# Patient Record
Sex: MALE | Race: WHITE | NOT HISPANIC OR LATINO | Employment: FULL TIME | URBAN - METROPOLITAN AREA
[De-identification: names, ages, dates, MRNs, and addresses within clinical notes are randomized per-mention and may not be internally consistent; named-entity substitution may affect disease eponyms.]

---

## 2023-11-07 PROBLEM — L82.1 OTHER SEBORRHEIC KERATOSIS: Status: ACTIVE | Noted: 2017-07-12

## 2023-11-07 PROBLEM — M77.01 GOLFERS ELBOW OF RIGHT UPPER EXTREMITY: Status: ACTIVE | Noted: 2023-11-07

## 2023-11-07 PROBLEM — D22.39 MELANOCYTIC NEVI OF OTHER PARTS OF FACE: Status: ACTIVE | Noted: 2017-07-12

## 2023-11-07 PROBLEM — I10 BENIGN ESSENTIAL HTN: Status: ACTIVE | Noted: 2023-11-07

## 2023-11-07 PROBLEM — M25.521 RIGHT ELBOW PAIN: Status: ACTIVE | Noted: 2023-11-07

## 2023-11-08 ENCOUNTER — OFFICE VISIT (OUTPATIENT)
Dept: PRIMARY CARE | Facility: CLINIC | Age: 63
End: 2023-11-08
Payer: COMMERCIAL

## 2023-11-08 VITALS
TEMPERATURE: 97 F | HEIGHT: 75 IN | WEIGHT: 195.6 LBS | HEART RATE: 58 BPM | OXYGEN SATURATION: 97 % | DIASTOLIC BLOOD PRESSURE: 98 MMHG | SYSTOLIC BLOOD PRESSURE: 152 MMHG | BODY MASS INDEX: 24.32 KG/M2

## 2023-11-08 DIAGNOSIS — Z00.00 HEALTHCARE MAINTENANCE: Primary | ICD-10-CM

## 2023-11-08 DIAGNOSIS — I10 BENIGN ESSENTIAL HTN: ICD-10-CM

## 2023-11-08 PROCEDURE — 3080F DIAST BP >= 90 MM HG: CPT | Performed by: INTERNAL MEDICINE

## 2023-11-08 PROCEDURE — 3077F SYST BP >= 140 MM HG: CPT | Performed by: INTERNAL MEDICINE

## 2023-11-08 PROCEDURE — 99396 PREV VISIT EST AGE 40-64: CPT | Performed by: INTERNAL MEDICINE

## 2023-11-08 PROCEDURE — 1036F TOBACCO NON-USER: CPT | Performed by: INTERNAL MEDICINE

## 2023-11-08 RX ORDER — LISINOPRIL AND HYDROCHLOROTHIAZIDE 12.5; 2 MG/1; MG/1
1 TABLET ORAL DAILY
Qty: 30 TABLET | Refills: 5 | Status: SHIPPED | OUTPATIENT
Start: 2023-11-08 | End: 2024-04-25

## 2023-11-08 RX ORDER — HYDROCHLOROTHIAZIDE 12.5 MG/1
1 CAPSULE ORAL DAILY
COMMUNITY
Start: 2015-04-08 | End: 2023-11-08 | Stop reason: ALTCHOICE

## 2023-11-08 RX ORDER — ATENOLOL 25 MG/1
1 TABLET ORAL DAILY
COMMUNITY
Start: 2013-01-07 | End: 2023-12-04

## 2023-11-08 ASSESSMENT — ENCOUNTER SYMPTOMS
DIARRHEA: 0
SHORTNESS OF BREATH: 0
OCCASIONAL FEELINGS OF UNSTEADINESS: 0
HEADACHES: 0
DEPRESSION: 0
LOSS OF SENSATION IN FEET: 0
CONSTIPATION: 0
ABDOMINAL PAIN: 0

## 2023-11-08 ASSESSMENT — PATIENT HEALTH QUESTIONNAIRE - PHQ9
1. LITTLE INTEREST OR PLEASURE IN DOING THINGS: NOT AT ALL
SUM OF ALL RESPONSES TO PHQ9 QUESTIONS 1 AND 2: 0
2. FEELING DOWN, DEPRESSED OR HOPELESS: NOT AT ALL

## 2023-11-08 NOTE — PROGRESS NOTES
"Subjective   Patient ID: Irvin Galvan is a 63 y.o. male who presents for Annual Exam.    The patient completed right medial meniscectomy and chondroplasty of the patella in 12/30/2022.  He has recovered relatively well, and previously followed with  from Orthopedic Surgery.    The patient's blood pressure in the clinic today was elevated at 152/98 mmHg.  He suspects this may be due to work-related stress, and last had coffee this morning.  He does have a wrist sphygmomanometer at home.  The patient is currently maintained with atenolol 25 mg once daily and hydrochlorothiazide 12.5mg once daily and is tolerating this regimen well.  The patient denies any headache, dyspnea, chest pain, or chest pressure.  He does endorse occasional visual \"floaters\" and follows with Ophthalmology.    The patient follows regularly with a Dentist.  He denies any hearing impairment, abdominal pain, or bowel problems.      The patient works as a .      Review of Systems   HENT:  Negative for hearing loss.    Eyes:  Positive for visual disturbance.   Respiratory:  Negative for shortness of breath.    Cardiovascular:  Negative for chest pain.   Gastrointestinal:  Negative for abdominal pain, constipation and diarrhea.   Neurological:  Negative for headaches.       Objective   Physical Exam  Constitutional:       Appearance: Normal appearance.   Neck:      Vascular: No carotid bruit.   Cardiovascular:      Rate and Rhythm: Normal rate and regular rhythm.      Heart sounds: Normal heart sounds.   Pulmonary:      Effort: Pulmonary effort is normal.      Breath sounds: Normal breath sounds.   Abdominal:      General: Bowel sounds are normal.      Palpations: Abdomen is soft.      Tenderness: There is no abdominal tenderness.   Skin:     General: Skin is warm and dry.   Neurological:      General: No focal deficit present.      Mental Status: He is alert and oriented to person, place, and time. Mental status is at " baseline.   Psychiatric:         Mood and Affect: Mood normal.         Behavior: Behavior normal.         Assessment/Plan   Problem List Items Addressed This Visit             ICD-10-CM    Benign essential HTN I10    Relevant Medications    lisinopriL-hydrochlorothiazide 20-12.5 mg tablet     Other Visit Diagnoses         Codes    Healthcare maintenance    -  Primary Z00.00    Relevant Orders    Lipid panel    Comprehensive metabolic panel    CBC and Auto Differential    PSA            CPE Performed  -  Discussed healthy diet and regular exercise.    -  Physical exam overall unremarkable. Immunizations reviewed and updated accordingly. Healthy lifestyle choices discussed (tobacco avoidance, appropriate alcohol use, avoidance of illicit substances).   -  Patient is wearing seatbelt.   -  Screening lab work ordered as indicated.    -  Age appropriate screening tests reviewed with patient.        EKG unremarkable compared to previous.    IMPRESSIONS/PLAN:     HTN   - /98 in office today, similar on repeat.  Prescribed lisinopril-HCTZ 20-12.5mg QD, and continue atenolol 25mg QD.  STOP HCTZ 12.5mg every day and keep on hand for now.  Discussed adverse effect profile of lisinopril including dizziness and dry cough.  Advised patient to measure blood pressure at home regularly as demonstrated in the clinic.  Instructed patient to complete blood work one month from now to monitor renal function.  Call the clinic if consistently elevated above 140/90 or with any adverse effects.     Right Knee Pain   - s/p right medial meniscectomy and chondroplasty of the patella in 12/30/2022. Previously following with  from Orthopedic Surgery.    Health Maintenance   - Routine labs ordered including CBC, CMP, and a lipid panel to be completed in the fasting state. Added PSA. Last PSA wnl 11/2022. Last colonoscopy performed 3/2022, repeat due 3/2032.      Follow up for annual physical examination, call sooner if needed.         Scribe Attestation  By signing my name below, I, Chandra Retana   attest that this documentation has been prepared under the direction and in the presence of Juan C Stokes DO.

## 2023-12-04 DIAGNOSIS — I10 BENIGN ESSENTIAL HTN: Primary | ICD-10-CM

## 2023-12-04 RX ORDER — ATENOLOL 25 MG/1
25 TABLET ORAL DAILY
Qty: 90 TABLET | Refills: 3 | Status: SHIPPED | OUTPATIENT
Start: 2023-12-04

## 2023-12-14 ENCOUNTER — LAB (OUTPATIENT)
Dept: LAB | Facility: LAB | Age: 63
End: 2023-12-14
Payer: COMMERCIAL

## 2023-12-14 DIAGNOSIS — Z00.00 HEALTHCARE MAINTENANCE: ICD-10-CM

## 2023-12-14 LAB
ALBUMIN SERPL BCP-MCNC: 4.7 G/DL (ref 3.4–5)
ALP SERPL-CCNC: 39 U/L (ref 33–136)
ALT SERPL W P-5'-P-CCNC: 20 U/L (ref 10–52)
ANION GAP SERPL CALC-SCNC: 14 MMOL/L (ref 10–20)
AST SERPL W P-5'-P-CCNC: 21 U/L (ref 9–39)
BASOPHILS # BLD AUTO: 0.02 X10*3/UL (ref 0–0.1)
BASOPHILS NFR BLD AUTO: 0.2 %
BILIRUB SERPL-MCNC: 1.6 MG/DL (ref 0–1.2)
BUN SERPL-MCNC: 21 MG/DL (ref 6–23)
CALCIUM SERPL-MCNC: 10 MG/DL (ref 8.6–10.3)
CHLORIDE SERPL-SCNC: 99 MMOL/L (ref 98–107)
CHOLEST SERPL-MCNC: 217 MG/DL (ref 0–199)
CHOLESTEROL/HDL RATIO: 3.4
CO2 SERPL-SCNC: 32 MMOL/L (ref 21–32)
CREAT SERPL-MCNC: 0.81 MG/DL (ref 0.5–1.3)
EOSINOPHIL # BLD AUTO: 0.22 X10*3/UL (ref 0–0.7)
EOSINOPHIL NFR BLD AUTO: 2.5 %
ERYTHROCYTE [DISTWIDTH] IN BLOOD BY AUTOMATED COUNT: 11.9 % (ref 11.5–14.5)
GFR SERPL CREATININE-BSD FRML MDRD: >90 ML/MIN/1.73M*2
GLUCOSE SERPL-MCNC: 103 MG/DL (ref 74–99)
HCT VFR BLD AUTO: 45.1 % (ref 41–52)
HDLC SERPL-MCNC: 64 MG/DL
HGB BLD-MCNC: 15.3 G/DL (ref 13.5–17.5)
IMM GRANULOCYTES # BLD AUTO: 0.03 X10*3/UL (ref 0–0.7)
IMM GRANULOCYTES NFR BLD AUTO: 0.3 % (ref 0–0.9)
LDLC SERPL CALC-MCNC: 128 MG/DL
LYMPHOCYTES # BLD AUTO: 4.41 X10*3/UL (ref 1.2–4.8)
LYMPHOCYTES NFR BLD AUTO: 50.9 %
MCH RBC QN AUTO: 33 PG (ref 26–34)
MCHC RBC AUTO-ENTMCNC: 33.9 G/DL (ref 32–36)
MCV RBC AUTO: 97 FL (ref 80–100)
MONOCYTES # BLD AUTO: 1.14 X10*3/UL (ref 0.1–1)
MONOCYTES NFR BLD AUTO: 13.1 %
NEUTROPHILS # BLD AUTO: 2.85 X10*3/UL (ref 1.2–7.7)
NEUTROPHILS NFR BLD AUTO: 33 %
NON HDL CHOLESTEROL: 153 MG/DL (ref 0–149)
NRBC BLD-RTO: 0 /100 WBCS (ref 0–0)
PLATELET # BLD AUTO: 260 X10*3/UL (ref 150–450)
POTASSIUM SERPL-SCNC: 4.4 MMOL/L (ref 3.5–5.3)
PROT SERPL-MCNC: 7.7 G/DL (ref 6.4–8.2)
PSA SERPL-MCNC: 0.82 NG/ML
RBC # BLD AUTO: 4.63 X10*6/UL (ref 4.5–5.9)
SODIUM SERPL-SCNC: 141 MMOL/L (ref 136–145)
TRIGL SERPL-MCNC: 125 MG/DL (ref 0–149)
VLDL: 25 MG/DL (ref 0–40)
WBC # BLD AUTO: 8.7 X10*3/UL (ref 4.4–11.3)

## 2023-12-14 PROCEDURE — 80061 LIPID PANEL: CPT

## 2023-12-14 PROCEDURE — 80053 COMPREHEN METABOLIC PANEL: CPT

## 2023-12-14 PROCEDURE — 36415 COLL VENOUS BLD VENIPUNCTURE: CPT

## 2023-12-14 PROCEDURE — 84153 ASSAY OF PSA TOTAL: CPT

## 2023-12-14 PROCEDURE — 85025 COMPLETE CBC W/AUTO DIFF WBC: CPT

## 2024-01-16 DIAGNOSIS — I10 BENIGN ESSENTIAL HTN: Primary | ICD-10-CM

## 2024-01-16 RX ORDER — HYDROCHLOROTHIAZIDE 12.5 MG/1
12.5 CAPSULE ORAL DAILY
Qty: 90 CAPSULE | Refills: 3 | Status: SHIPPED | OUTPATIENT
Start: 2024-01-16

## 2024-04-25 DIAGNOSIS — I10 BENIGN ESSENTIAL HTN: ICD-10-CM

## 2024-04-25 RX ORDER — LISINOPRIL AND HYDROCHLOROTHIAZIDE 12.5; 2 MG/1; MG/1
1 TABLET ORAL DAILY
Qty: 30 TABLET | Refills: 5 | Status: SHIPPED | OUTPATIENT
Start: 2024-04-25

## 2024-10-20 DIAGNOSIS — I10 BENIGN ESSENTIAL HTN: ICD-10-CM

## 2024-10-21 RX ORDER — LISINOPRIL AND HYDROCHLOROTHIAZIDE 12.5; 2 MG/1; MG/1
1 TABLET ORAL DAILY
Qty: 30 TABLET | Refills: 1 | Status: SHIPPED | OUTPATIENT
Start: 2024-10-21

## 2024-11-08 ENCOUNTER — APPOINTMENT (OUTPATIENT)
Dept: PRIMARY CARE | Facility: CLINIC | Age: 64
End: 2024-11-08
Payer: COMMERCIAL

## 2024-11-08 VITALS
OXYGEN SATURATION: 98 % | WEIGHT: 181 LBS | RESPIRATION RATE: 16 BRPM | SYSTOLIC BLOOD PRESSURE: 128 MMHG | HEART RATE: 71 BPM | HEIGHT: 74 IN | TEMPERATURE: 97.5 F | DIASTOLIC BLOOD PRESSURE: 70 MMHG | BODY MASS INDEX: 23.23 KG/M2

## 2024-11-08 DIAGNOSIS — Z00.00 HEALTHCARE MAINTENANCE: Primary | ICD-10-CM

## 2024-11-08 PROCEDURE — 3008F BODY MASS INDEX DOCD: CPT | Performed by: INTERNAL MEDICINE

## 2024-11-08 PROCEDURE — 99396 PREV VISIT EST AGE 40-64: CPT | Performed by: INTERNAL MEDICINE

## 2024-11-08 PROCEDURE — 3078F DIAST BP <80 MM HG: CPT | Performed by: INTERNAL MEDICINE

## 2024-11-08 PROCEDURE — 1036F TOBACCO NON-USER: CPT | Performed by: INTERNAL MEDICINE

## 2024-11-08 PROCEDURE — 3074F SYST BP LT 130 MM HG: CPT | Performed by: INTERNAL MEDICINE

## 2024-11-08 PROCEDURE — 90471 IMMUNIZATION ADMIN: CPT | Performed by: INTERNAL MEDICINE

## 2024-11-08 PROCEDURE — 90656 IIV3 VACC NO PRSV 0.5 ML IM: CPT | Performed by: INTERNAL MEDICINE

## 2024-11-08 PROCEDURE — 93000 ELECTROCARDIOGRAM COMPLETE: CPT | Performed by: INTERNAL MEDICINE

## 2024-11-08 ASSESSMENT — PROMIS GLOBAL HEALTH SCALE
CARRYOUT_PHYSICAL_ACTIVITIES: COMPLETELY
RATE_GENERAL_HEALTH: VERY GOOD
RATE_QUALITY_OF_LIFE: EXCELLENT
RATE_SOCIAL_SATISFACTION: EXCELLENT
CARRYOUT_SOCIAL_ACTIVITIES: EXCELLENT
RATE_AVERAGE_PAIN: 0
RATE_PHYSICAL_HEALTH: VERY GOOD
RATE_MENTAL_HEALTH: EXCELLENT
EMOTIONAL_PROBLEMS: NEVER

## 2024-11-08 ASSESSMENT — ENCOUNTER SYMPTOMS
FREQUENCY: 0
DIARRHEA: 0
ARTHRALGIAS: 0
ABDOMINAL PAIN: 0
BLOOD IN STOOL: 0
CONSTIPATION: 0
SHORTNESS OF BREATH: 0

## 2024-11-08 NOTE — PROGRESS NOTES
Patient here for a physical     Subjective   Patient ID: Irvin Galvan is a 64 y.o. male who presents for Annual Exam.  He is generally doing well today, and has no complaints.    The patient is maintained with lisinopril-HCTZ 20-12.5mg once daily, and atenolol 25mg every day, and is tolerating the regimen well.  He has stopped the additional hydrochlorothiazide 12.5mg dose as previously advised during the last clinic visit.  The blood pressure to day was 128/70 mmHg in the office.     The patient mentions occasional nocturia of once per evening.  He denies any weakening of the urinary stream or other urinary symptoms.    The patient denies any hearing impairment or vision changes, and wears prescription lenses.  He follows regularly with a Dentist.  The patient denies any dyspnea, chest pressure, chest pain, abdominal pain, hematochezia, melena, bowel problems, or joint pain.     The patient is pleased to report a weight loss of 14 lbs since 11/2023 which he attributes to healthy dietary changes since 3/2024, and increased walking while at school.        Review of Systems   HENT:  Negative for hearing loss.    Eyes:  Negative for visual disturbance.   Respiratory:  Negative for shortness of breath.    Cardiovascular:  Negative for chest pain.   Gastrointestinal:  Negative for abdominal pain, blood in stool, constipation and diarrhea.   Genitourinary:  Negative for frequency.        Positive for nocturia.  Negative for weakening of the urinary stream.   Musculoskeletal:  Negative for arthralgias.       Objective   Physical Exam  Constitutional:       Appearance: Normal appearance.   Neck:      Vascular: No carotid bruit.   Cardiovascular:      Rate and Rhythm: Normal rate and regular rhythm.      Heart sounds: Normal heart sounds.   Pulmonary:      Effort: Pulmonary effort is normal.      Breath sounds: Normal breath sounds.   Abdominal:      General: Bowel sounds are normal.      Palpations: Abdomen is soft.       Tenderness: There is no abdominal tenderness.   Skin:     General: Skin is warm and dry.   Neurological:      General: No focal deficit present.      Mental Status: He is alert and oriented to person, place, and time. Mental status is at baseline.   Psychiatric:         Mood and Affect: Mood normal.         Behavior: Behavior normal.       Assessment/Plan   Problem List Items Addressed This Visit    None  Visit Diagnoses         Codes    Healthcare maintenance    -  Primary Z00.00    Relevant Orders    ECG 12 lead (Clinic Performed) (Completed)    Lipid panel    CBC and Auto Differential    Comprehensive metabolic panel    PSA            CPE Performed  -  Discussed healthy diet and regular exercise.    -  Physical exam overall unremarkable. Immunizations reviewed and updated accordingly. Healthy lifestyle choices discussed (tobacco avoidance, appropriate alcohol use, avoidance of illicit substances).   -  Patient is wearing seatbelt.   -  Screening lab work ordered as indicated.    -  Age appropriate screening tests reviewed with patient.        EKG unremarkable compared to previous.    IMPRESSIONS/PLAN:     HTN   - /70 in office today.  Maintained with lisinopril-HCTZ 20-12.5mg QD, and atenolol 25mg QD.  Previously on HCTZ 12.5mg every day and keep on hand for now.  Advised patient to measure blood pressure at home regularly as demonstrated in the clinic.  Instructed patient to complete blood work one month from now to monitor renal function.  Call the clinic if consistently elevated above 140/90 or with any adverse effects.     Health Maintenance   - Routine labs ordered including CBC, CMP, and a lipid panel to be completed in the fasting state. Added PSA. Last PSA wnl 12/2023. Last colonoscopy performed 3/2022, repeat due 3/2032.  Shingrix and TDAP UTD.  Patient received Influenza vaccine in the clinic today, tolerated well.      Follow up for annual physical examination, call sooner if needed.         Scribe Attestation  By signing my name below, I, Chandra Retana   attest that this documentation has been prepared under the direction and in the presence of Juan C Stokes DO.   Noemi Parish 11/08/24 1:10 PM

## 2024-11-16 DIAGNOSIS — I10 BENIGN ESSENTIAL HTN: ICD-10-CM

## 2024-11-18 RX ORDER — LISINOPRIL AND HYDROCHLOROTHIAZIDE 12.5; 2 MG/1; MG/1
1 TABLET ORAL DAILY
Qty: 90 TABLET | Refills: 1 | Status: SHIPPED | OUTPATIENT
Start: 2024-11-18

## 2024-11-27 ENCOUNTER — LAB (OUTPATIENT)
Dept: LAB | Facility: LAB | Age: 64
End: 2024-11-27
Payer: COMMERCIAL

## 2024-11-27 ENCOUNTER — PATIENT MESSAGE (OUTPATIENT)
Dept: PRIMARY CARE | Facility: CLINIC | Age: 64
End: 2024-11-27

## 2024-11-27 DIAGNOSIS — E78.00 ELEVATED LDL CHOLESTEROL LEVEL: Primary | ICD-10-CM

## 2024-11-27 DIAGNOSIS — Z00.00 HEALTHCARE MAINTENANCE: ICD-10-CM

## 2024-11-27 DIAGNOSIS — I10 BENIGN ESSENTIAL HTN: ICD-10-CM

## 2024-11-27 LAB
ALBUMIN SERPL BCP-MCNC: 4.5 G/DL (ref 3.4–5)
ALP SERPL-CCNC: 36 U/L (ref 33–136)
ALT SERPL W P-5'-P-CCNC: 16 U/L (ref 10–52)
ANION GAP SERPL CALC-SCNC: 13 MMOL/L (ref 10–20)
AST SERPL W P-5'-P-CCNC: 22 U/L (ref 9–39)
BASOPHILS # BLD AUTO: 0.02 X10*3/UL (ref 0–0.1)
BASOPHILS NFR BLD AUTO: 0.2 %
BILIRUB SERPL-MCNC: 1.1 MG/DL (ref 0–1.2)
BUN SERPL-MCNC: 17 MG/DL (ref 6–23)
CALCIUM SERPL-MCNC: 9.9 MG/DL (ref 8.6–10.6)
CHLORIDE SERPL-SCNC: 99 MMOL/L (ref 98–107)
CHOLEST SERPL-MCNC: 197 MG/DL (ref 0–199)
CHOLESTEROL/HDL RATIO: 3.1
CO2 SERPL-SCNC: 32 MMOL/L (ref 21–32)
CREAT SERPL-MCNC: 1.03 MG/DL (ref 0.5–1.3)
EGFRCR SERPLBLD CKD-EPI 2021: 82 ML/MIN/1.73M*2
EOSINOPHIL # BLD AUTO: 0.18 X10*3/UL (ref 0–0.7)
EOSINOPHIL NFR BLD AUTO: 2.1 %
ERYTHROCYTE [DISTWIDTH] IN BLOOD BY AUTOMATED COUNT: 11.4 % (ref 11.5–14.5)
GLUCOSE SERPL-MCNC: 103 MG/DL (ref 74–99)
HCT VFR BLD AUTO: 40.3 % (ref 41–52)
HDLC SERPL-MCNC: 62.8 MG/DL
HGB BLD-MCNC: 14 G/DL (ref 13.5–17.5)
IMM GRANULOCYTES # BLD AUTO: 0.02 X10*3/UL (ref 0–0.7)
IMM GRANULOCYTES NFR BLD AUTO: 0.2 % (ref 0–0.9)
LDLC SERPL CALC-MCNC: 107 MG/DL
LYMPHOCYTES # BLD AUTO: 4.29 X10*3/UL (ref 1.2–4.8)
LYMPHOCYTES NFR BLD AUTO: 50.6 %
MCH RBC QN AUTO: 33.4 PG (ref 26–34)
MCHC RBC AUTO-ENTMCNC: 34.7 G/DL (ref 32–36)
MCV RBC AUTO: 96 FL (ref 80–100)
MONOCYTES # BLD AUTO: 0.94 X10*3/UL (ref 0.1–1)
MONOCYTES NFR BLD AUTO: 11.1 %
NEUTROPHILS # BLD AUTO: 3.03 X10*3/UL (ref 1.2–7.7)
NEUTROPHILS NFR BLD AUTO: 35.8 %
NON HDL CHOLESTEROL: 134 MG/DL (ref 0–149)
NRBC BLD-RTO: 0 /100 WBCS (ref 0–0)
PLATELET # BLD AUTO: 258 X10*3/UL (ref 150–450)
POTASSIUM SERPL-SCNC: 3.9 MMOL/L (ref 3.5–5.3)
PROT SERPL-MCNC: 7.6 G/DL (ref 6.4–8.2)
PSA SERPL-MCNC: 0.68 NG/ML
RBC # BLD AUTO: 4.19 X10*6/UL (ref 4.5–5.9)
SODIUM SERPL-SCNC: 140 MMOL/L (ref 136–145)
TRIGL SERPL-MCNC: 134 MG/DL (ref 0–149)
VLDL: 27 MG/DL (ref 0–40)
WBC # BLD AUTO: 8.5 X10*3/UL (ref 4.4–11.3)

## 2024-11-27 PROCEDURE — 84153 ASSAY OF PSA TOTAL: CPT

## 2024-11-27 PROCEDURE — 85025 COMPLETE CBC W/AUTO DIFF WBC: CPT

## 2024-11-27 PROCEDURE — 36415 COLL VENOUS BLD VENIPUNCTURE: CPT

## 2024-11-27 PROCEDURE — 80053 COMPREHEN METABOLIC PANEL: CPT

## 2024-11-27 PROCEDURE — 80061 LIPID PANEL: CPT

## 2024-11-27 RX ORDER — ATENOLOL 25 MG/1
25 TABLET ORAL DAILY
Qty: 90 TABLET | Refills: 3 | Status: SHIPPED | OUTPATIENT
Start: 2024-11-27

## 2024-11-27 RX ORDER — ATORVASTATIN CALCIUM 10 MG/1
10 TABLET, FILM COATED ORAL DAILY
Qty: 100 TABLET | Refills: 3 | Status: SHIPPED | OUTPATIENT
Start: 2024-11-27 | End: 2026-01-01

## 2025-01-17 DIAGNOSIS — M25.569 KNEE PAIN, UNSPECIFIED CHRONICITY, UNSPECIFIED LATERALITY: Primary | ICD-10-CM

## 2025-02-04 ENCOUNTER — OFFICE VISIT (OUTPATIENT)
Dept: ORTHOPEDIC SURGERY | Facility: CLINIC | Age: 65
End: 2025-02-04
Payer: COMMERCIAL

## 2025-02-04 ENCOUNTER — HOSPITAL ENCOUNTER (OUTPATIENT)
Dept: RADIOLOGY | Facility: CLINIC | Age: 65
Discharge: HOME | End: 2025-02-04
Payer: COMMERCIAL

## 2025-02-04 DIAGNOSIS — M25.561 RIGHT KNEE PAIN, UNSPECIFIED CHRONICITY: ICD-10-CM

## 2025-02-04 DIAGNOSIS — M54.31 SCIATICA OF RIGHT SIDE: ICD-10-CM

## 2025-02-04 DIAGNOSIS — M25.569 KNEE PAIN, UNSPECIFIED CHRONICITY, UNSPECIFIED LATERALITY: ICD-10-CM

## 2025-02-04 PROCEDURE — 73564 X-RAY EXAM KNEE 4 OR MORE: CPT | Mod: RT

## 2025-02-04 PROCEDURE — 99214 OFFICE O/P EST MOD 30 MIN: CPT | Performed by: ORTHOPAEDIC SURGERY

## 2025-02-04 PROCEDURE — 73564 X-RAY EXAM KNEE 4 OR MORE: CPT | Mod: RIGHT SIDE | Performed by: RADIOLOGY

## 2025-02-04 PROCEDURE — 1036F TOBACCO NON-USER: CPT | Performed by: ORTHOPAEDIC SURGERY

## 2025-02-04 RX ORDER — CYCLOBENZAPRINE HCL 10 MG
10 TABLET ORAL 2 TIMES DAILY PRN
Qty: 20 TABLET | Refills: 0 | Status: SHIPPED | OUTPATIENT
Start: 2025-02-04 | End: 2025-02-24

## 2025-02-04 RX ORDER — METHYLPREDNISOLONE 4 MG/1
TABLET ORAL
Qty: 21 TABLET | Refills: 0 | Status: SHIPPED | OUTPATIENT
Start: 2025-02-04

## 2025-02-04 NOTE — PROGRESS NOTES
History of Present Illness:   Patient presents today for follow-up evaluation of right knee pain as well as new evaluation of right thigh/buttock pain that radiates into the lower leg laterally.  He states that this pain is relatively new to him, and he feels it could be sciatica.  Regarding the knee pain he notes a medial sided pain without mechanical symptoms.  He states that he cannot necessarily recall which pain began to bother him first however he feels he is walking differently now that could be throwing off some of the back problems.  Denies any significant numbness or tingling in the lower extremity.  Does states is difficult to sit and sleep at night.    Review of Systems   GENERAL: Negative for malaise, significant weight loss, fever  MUSCULOSKELETAL: see HPI  NEURO:  Negative    Physical Examination:  Right Hip:  Normal gait  Negative Trendelenburg sign  Skin healthy and intact  No tenderness to palpation over lumbar spine  No tenderness over greater trochanter    Full forward flexion  Symmetric motion, no loss of internal rotation   No weakness with resisted hip flexion, abduction or adduction    Negative flexion/adduction/internal rotation  Negative flexion/abduction/external rotation test  Pain with straight leg raise    Motor exam: Gross strength intact knee flexion extension, hip flexion extension, ankle dorsiflexion plantarflexion without any obvious deficits  Sensation intact L2-S1  No upper motor neuron signs     Right Knee:  Skin healthy and intact  No gross swelling or ecchymosis  Mild varus  No effusion  ROM:  Full flexion   Full extension  No pain with internal rotation of the hip    Mild tenderness to palpation over medial joint line  Mild tenderness over the medial tibia.  No tenderness to palpation over lateral joint line  No laxity to valgus stress  No laxity to varus stress  Negative Lachman´s test  Negative anterior drawer test  Negative posterior drawer test  Negative Sujatha´s  test    Neurovascular exam normal distally     Imaging:  Plain films of the right knee reveal no acute fractures or dislocation, good alignment and position, mild evidence of medial DJD with mild evidence of patellofemoral DJD, otherwise normal.    Previous arthroscopic photos arthroscopic partial meniscectomy of the right knee from 2022 reveal grade 2 changes diffuse of the patella, medial and lateral compartment grade 1 changes via Out[place] classification.    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  We also reviewed the overall disease progression of the right knee and do feel that he has some mild degenerative changes.  There is some small clinical suspicion that he would have recurrent meniscus problems however given lack of mechanical symptoms and lack of significant joint line tenderness we do not feel this is the case.  We discussed further treatments for the patient's arthritis would include corticosteroid injections into the knee, as well as anti-inflammatories and some physical therapy.  However, we feel that the patient's dominant symptoms today are sciatica.    Deniz Quiñones PA-C    In a face to face encounter, I evaluated the patient and performed a physical examination, discussed pertinent diagnostic studies if indicated and discussed diagnosis and management strategies with both the patient and physician assistant / nurse practitioner.  I reviewed the PA/NP's note and agree with the documented findings and plan of care.    1.  Sciatica.  Discussed Medrol Dosepak, formal physical therapy.  He has been doing a home exercise program.  We will refer for formal spine evaluation, discussed Flexeril to help at night with muscle spasms and sleeping.    2.  Mild medial  knee pain.  We discussed a Medrol Dosepak will help some could consider corticosteroid injection he had minimal medial compartment wear and discussed the possibility of further meniscal tearing, consider MRI if symptoms do not improve.    Jhonatan Aguilar MD

## 2025-02-20 ENCOUNTER — EVALUATION (OUTPATIENT)
Dept: PHYSICAL THERAPY | Facility: CLINIC | Age: 65
End: 2025-02-20
Payer: COMMERCIAL

## 2025-02-20 DIAGNOSIS — M25.569 KNEE PAIN, UNSPECIFIED CHRONICITY, UNSPECIFIED LATERALITY: ICD-10-CM

## 2025-02-20 DIAGNOSIS — M54.31 SCIATICA OF RIGHT SIDE: ICD-10-CM

## 2025-02-20 PROCEDURE — 97161 PT EVAL LOW COMPLEX 20 MIN: CPT | Mod: GP | Performed by: PHYSICAL THERAPIST

## 2025-02-20 PROCEDURE — 97110 THERAPEUTIC EXERCISES: CPT | Mod: GP | Performed by: PHYSICAL THERAPIST

## 2025-02-20 ASSESSMENT — ENCOUNTER SYMPTOMS
LOSS OF SENSATION IN FEET: 0
OCCASIONAL FEELINGS OF UNSTEADINESS: 0
DEPRESSION: 0

## 2025-02-20 NOTE — PROGRESS NOTES
Initial evaluation  Physical Therapy Initial Evaluation    Patient Name:Irvin Galvan  MRN:[id number]  Today's Date:2/20/2025  Referred by: Jhonatan Aguilar  Time Calculation  Start Time: 1515  Stop Time: 1600  Time Calculation (min): 45 min    Therapy Diagnosis  1. Knee pain, unspecified chronicity, unspecified laterality    2. Sciatica of right side         Plan of Care  Planned interventions include PRN: therapeutic exercise, manual therapy, gait training, electrical stimulation, hot pack, HEP training.   Frequency and duration: 1 time(s) a week, for  8 weeks or 8 visits .   Plan of care was developed with input and agreement by the patient.   Plan for next session: review HEP, begin glute/core stabilization, Piriformis mobilization    Assessment  Problem List: Pain, range of motion/joint mobility, strength, activity limitations, ADLs/IADLs/self care skills, decreased functional level,  decreased knowledge of HEP, flexibility,, and posture.     Patient is a 64 y.o. male who presents with complaint of chronic knee pain and recent onset of sciatic pain on the R side. Standardized testing and measures administered today reveal that the patient has multiple impairments in body structures and functions, activity limitations, and participation restrictions. These include subjective and objective findings such as pain, tenderness to palpation of the affected area, decreased ROM, strength, flexibility, and function. The patient's impairments are likely influenced by mechanical dysfunction and deconditioning with possible overuse and degenerative changes. Skilled PT services are warranted in order to realize measurable and meaningful change in the above outcome measures and achieve improvements in the patient's functional status and individual goals.    Rehab Potential:good  Clinical Presentation: Stable and/or uncomplicated characteristics.     Evaluation Complexity: Low  Moderate  High    Precautions/Fall Risk:none*  Pacemaker no  Seizures No  Post Op Movement/Restrictions No    Insurance  Visit number: 1   Approved number of visits: 40  Onset Date: 2025  Certification Period:  Beginnin2025            Ending:   Payor: MED[employer] [facility] / Plan: [employer] / Product Type: *No Product type* /     Subjective  Chief complaint/reason for visit: Patient is a 64 y.o male who presents today for evaluation of right knee pain as well as new evaluation of right thigh/buttock pain that radiates into the lower leg laterally.  He states that this pain is relatively new to him, and he feels it could be sciatica.  Regarding the knee pain he notes a medial sided pain without mechanical symptoms.  He states that he cannot necessarily recall which pain began to bother him first however he feels he is walking differently now that could be throwing off some of the back problems.  Denies any significant numbness or tingling in the lower extremity.  Does states is difficult to sit and sleep at night.  Mechanism of Injury:  a chronic condition and a progressive, insidious condition  Location of Pain: R knee/R hip glute  Current Pain Level (0-10): 5   High Pain Level (0-10): 10   Low Pain Level (0-10): 2  Pain Quality: achingand burning  Pain Exacerbating Factors: lying down, sitting, bending  Pain Relieving Factors: heat and medications nonsteroidal anti-inflammatory drugs and prescribed pain medications    Medical Screening: Reviewed medical history form with patient and medical screening assessed.   Red Flags: Do you have any of the following? No  Fever/chills, unexplained weight changes, dizziness/fainting, unexplained change in bowel or bladder functions, unexplained malaise or muscle weakness, night pain/sweats, numbness or tingling  Current Medical Management: Ortho, X-ray, Injection  Prior Level of Function (PLOF)  Patient previously independent with all ADLs  Exercise/Physical Activity: Phys , Light  Lifting (bench press), Walking  Functional limitations: decreased positional tolerances to standing, sitting, walking, bending, stairs, self-care activities, work related tasks, lifting, participation in home management/duties, participation in leisure activities and athletics.  Work Status: full time job doing   Current Status: improved  Patient Awareness: Patient is aware of  his diagnosis and prognosis.   Living Environment: house  Social Support:  N/A  Personal Factors That May Impact Care: N/A  Patient's Goal for Treatment: relieving pain, increasing strength, increasing mobility, improving positional tolerances, walking with a normal gait, reducing symptoms, returning to work symptom free,  and resuming athletics/activities .     Objective  Other Measures  Lower Extremity Funtional Score (LEFS): 66  Oswestry Disablity Index (EMERSON): 13     Observation/Posture: sits on wallet (L glute pocket)  Gait: Antalgic   Palpation: TTP R SIJ and glute med/piriformis    Lumbar AROM:   Lumbar Flexion (%): 50% with pain   Lumbar Extension (%): 50% with pain   Lumbar Sidebend R/L (%): 75%  Lumbar Rotation R/L (%): 75% with pain on R // 75% with pain on R  Lower Quarter Screen:   MMT: R side 4+/5 with hip flexion, knee extension and flexion and R SIJ pain // L side WFL and pain free  Sensation: intact bilaterally   Special Testing  SLR: (+) on R side  AYDEN: neg  Poe's: (+) Pain on R  SI Tests: (+) jaswinder's    Treatment Performed Today: Initial evaluation and patient education regarding diagnosis, prognosis, contributing factors, comorbidities, importance and instruction of HEP, role of PT, postural re-education, activity modification, and body mechanics.    Therapeutic Exercise 15 minutes  Education/Resources provided today: Home Program   [employer]: Provided, reviewed and performed the following therapeutic exercises with the patient:  Access Code: [email or web address]  URL: [email or web address]  Date:  02/20/2025  Prepared by: Lorrie Craig     Exercises  - Lying Prone  - 1 x daily - 7 x weekly - 2-3 minutes hold  - Static Prone on Elbows  - 1 x daily - 7 x weekly - 2-3 hold  - Prone Press Up  - 1 x daily - 7 x weekly - 3 sets - 10 reps  - Standing Lumbar Extension  - 1 x daily - 7 x weekly - 3 sets - 10 reps  - Supine Sciatic Nerve Glide  - 1 x daily - 7 x weekly - 2 sets - 10 reps  - Supine Figure 4 Piriformis Stretch  - 1 x daily - 7 x weekly - 3 sets - 30 hold  - Seated Piriformis Stretch  - 1 x daily - 7 x weekly - 3 sets - 30 hold  - Supine Posterior Pelvic Tilt  - 1 x daily - 7 x weekly - 1 sets - 10 reps - 5 hold    Modalities   Electrical Stimulation          minutes    Response to Treatment: improved knowledge and understanding of condition, decreased pain, improved joint mobility/ROM, improved strength, improved flexibility, improved tissue mobility, improved posture, improved balance.    Goals: Goals set and discussed today.   Lumbar Goals  Lumbar Spine Goals:  By discharge, patient will:  1) Demonstrate independence with home exercise program  2) Tolerate PLOA without adverse reaction  3) Increase ROM of the lumbar spine to ? in order to improve the ability to perform essential ADLs  4) Increase strength of ? to ? in order to improve the ability to perform essential ADLs  5) Improve positional tolerances of standing, sitting, walking > 60 minutes for essential ADLs and work duties.   6) Report decrease in pain by >= 2 points to meet MCID  7) Decrease score of Modified EMERSON by > 6 points to meet the MCID  8) Ascend and descend 1 flight of stairs  stairs without an increase in symptoms  10) Be able to sleep through the night without an increase in symptoms  11) Be able to perform the ADL of sitting and bending without an increase or production of symptoms  12) Decrease time on 5x sit to stand test by > 2.3 sec to meet the MCID  Patient stated goal: relief of pain and tightness in LE

## 2025-02-24 ENCOUNTER — TREATMENT (OUTPATIENT)
Dept: PHYSICAL THERAPY | Facility: CLINIC | Age: 65
End: 2025-02-24
Payer: COMMERCIAL

## 2025-02-24 DIAGNOSIS — M25.569 KNEE PAIN, UNSPECIFIED CHRONICITY, UNSPECIFIED LATERALITY: Primary | ICD-10-CM

## 2025-02-24 DIAGNOSIS — M54.31 SCIATICA OF RIGHT SIDE: ICD-10-CM

## 2025-02-24 PROCEDURE — 97110 THERAPEUTIC EXERCISES: CPT | Mod: GP | Performed by: PHYSICAL THERAPIST

## 2025-02-24 PROCEDURE — 97140 MANUAL THERAPY 1/> REGIONS: CPT | Mod: GP | Performed by: PHYSICAL THERAPIST

## 2025-02-24 NOTE — PROGRESS NOTES
"Treatment note  Physical Therapy Treatment  Patient Name:Irvin Galvan  MRN:[id number]  Today's Date:2025  Referred by: Jhonatan Aguilar  Time Calculation  Start Time: 1400  Stop Time: 1443  Time Calculation (min): 43 min    Therapy Diagnosis  1. Knee pain, unspecified chronicity, unspecified laterality    2. Sciatica of right side         Insurance  Visit number: 2  Approved number of visits: 40  Onset Date: 2025  Certification Period:  Beginnin2025            Ending:   Payor: [employer] [facility] / Plan: [employer] / Product Type: *No Product type* /     Precautions/Fall Risk:none* Pacemaker no  Seizures No  Post Op Movement/Restrictions No    Subjective/Pain: Patient reports no changes with the exercises (he's been trying the standing extensions). He does report he went to bed and woke up pain free this morning but has been on his feet most of the day so his symptoms have gradually gotten worse in the small of his back and down his R leg. After some thinking, he feels that prolonged use of his wearable leaf blower may have exacerbated his lower back.   Current Pain Level (0-10): 8    Objective/Outcome Measures:  Moderate tenderness with palpation to R glute med  Quad/Hip flexor tightness    Treatment  Therapeutic Exercise 33 minutes  LE Ergo: 5 minutes   SKTC: 5\" x 10   Supine Hip Bridge: unable to tolerate   Side lying Clamshells: 2 x 10   1/2 Prone Hip Extension: x 10   MHP to glute med/piriformis w/ prone lying 3'   KAMI: 3'     Manual Therapy 10 minutes  Sacral Distraction/QL and Glute med release     Neuromuscular Re-ed   minutes    Gait Training   minutes    Modalities   Vasopneumatic Device       minutes  Electrical Stimulation          minutes  Ultrasound            minutes  Iontophoresis                     minutes  Cold Pack            minutes  Mechanical Traction           minutes    Assessment: Patient presents with increased complaints in his hip and back this date " which were made worse following manual therapy/TrP release to glute med this date. Any form of glute activation was very painful for patient except side lying clamshell which he was able to tolerate. He experiences improved complaints when lying on R side and worse when on L side. He has pain and difficulty with SL stance on R LE. Symptoms more central when prone lying/KAMI but painful when he gets up from position.     Plan: Hold piriformis stretching, establishing with back  On Friday      Goals:  Lumbar Goals  Lumbar Spine Goals:  By discharge, patient will:  1) Demonstrate independence with home exercise program  2) Tolerate PLOA without adverse reaction  3) Increase ROM of the lumbar spine to ? in order to improve the ability to perform essential ADLs  4) Increase strength of ? to ? in order to improve the ability to perform essential ADLs  5) Improve positional tolerances of standing, sitting, walking > 60 minutes for essential ADLs and work duties.   6) Report decrease in pain by >= 2 points to meet MCID  7) Decrease score of Modified EMERSON by > 6 points to meet the MCID  8) Ascend and descend 1 flight of stairs  stairs without an increase in symptoms  10) Be able to sleep through the night without an increase in symptoms  11) Be able to perform the ADL of sitting and bending without an increase or production of symptoms  12) Decrease time on 5x sit to stand test by > 2.3 sec to meet the MCID  Patient stated goal: relief of pain and tightness in LE

## 2025-02-28 ENCOUNTER — HOSPITAL ENCOUNTER (OUTPATIENT)
Dept: RADIOLOGY | Facility: HOSPITAL | Age: 65
Discharge: HOME | End: 2025-02-28
Payer: COMMERCIAL

## 2025-02-28 ENCOUNTER — APPOINTMENT (OUTPATIENT)
Dept: ORTHOPEDIC SURGERY | Facility: CLINIC | Age: 65
End: 2025-02-28
Payer: COMMERCIAL

## 2025-02-28 DIAGNOSIS — M54.50 LUMBAR PAIN: ICD-10-CM

## 2025-02-28 DIAGNOSIS — M54.31 SCIATICA OF RIGHT SIDE: Primary | ICD-10-CM

## 2025-02-28 PROCEDURE — 72110 X-RAY EXAM L-2 SPINE 4/>VWS: CPT

## 2025-02-28 NOTE — PROGRESS NOTES
Irvin Galvan is a 64 y.o. male who presents for Pain of the Lower Back (Xrays, no injury) and Pain of the Right Leg (Pain goes down leg,  did PT which did not help).    HPI:  64-year-old gentleman here for new patient evaluation of the low back pain.  Has pain down the right leg.  Sent by Dr. Aguilar.  He denies any fever chills nausea vomiting night sweats.  He has no bowel or bladder complaints.    Physical exam:  Well-nourished, well kept.No lymphangitis or lymphadenopathy in the examined extremities.  Gait normal.  Can stand on heels and toes.   Examination of the back shows some tenderness in the paraspinous musculature in the lower lumbar sacral region.  There is decreased range of motion in all directions due to guarding/muscle spasms and pain at extremes.  There is good strength and no instability.  Examination of the lower extremities reveals no point tenderness, swelling, or deformity.  Range of motion of the hips, knees, and ankles are full without crepitance, instability, or exacerbation of pain.  Strength is 5/5 throughout.  No redness, abrasions, or lesions on extremities  Gross sensation intact in the extremities.  Deep tendon reflexes 2+ bilateral. Clonus negative.  Affect normal.  Alert and oriented ×3.  Coordination normal.    Imaging studies:  AP lateral flexion-extension plain films of the lumbar spine were ordered and reviewed today.    Assessment:  64-year-old gentleman here for new patient evaluation of low back pain.  He is having lumbosacral pain and right lower extremity radicular type symptoms.  This has been going on for about a month, he does not recall any specific injury however he was using a large lawn and garden vacuum  a month ago to remove leaves.  Seen by Dr. Aguilar for right knee issues and was sent over by Dr. Aguilar for sciatica evaluation.  He does not normally have any kind of chronic back issues.  The pain starts in the low back centrally and will radiate out into the  right lower lumbosacral area in the right buttock it we will skip the thigh and quadricep and the pain resumes in the right calf and foot.  Has done some physical therapy for this however he feels like it is making him worse as it is rather painful while he is doing the therapy.  He is doing home exercises as well.  No history of epidural injections or back surgery.  Show some moderate degenerative changes throughout the lumbar spine he has got bridging osteophytes throughout the lumbar spine.  He has a severely degenerative level at L5-S1.  There is no scoliosis.  He has a mild spondylolisthesis of L4 on L5.  This patient is having symptoms consistent with a skip lesion.    Plan:  I would like him to continue with his physical therapy, we will get an MRI of his lumbar spine without contrast and see him back after that.    I have ordered and reviewed tests, x-rays, MRI.  I reviewed the notes from Dr. Aguilar from February for 2025 that discusses his referral to the spine team.  This is an undiagnosed new problem with uncertain prognosis that is affecting his bodily function.    Jarvis Garcia PA-C

## 2025-03-03 ENCOUNTER — TREATMENT (OUTPATIENT)
Dept: PHYSICAL THERAPY | Facility: CLINIC | Age: 65
End: 2025-03-03
Payer: COMMERCIAL

## 2025-03-03 DIAGNOSIS — M54.31 SCIATICA OF RIGHT SIDE: Primary | ICD-10-CM

## 2025-03-03 DIAGNOSIS — M25.569 KNEE PAIN, UNSPECIFIED CHRONICITY, UNSPECIFIED LATERALITY: ICD-10-CM

## 2025-03-03 PROCEDURE — 97014 ELECTRIC STIMULATION THERAPY: CPT | Mod: GP | Performed by: PHYSICAL THERAPIST

## 2025-03-03 PROCEDURE — 97110 THERAPEUTIC EXERCISES: CPT | Mod: GP | Performed by: PHYSICAL THERAPIST

## 2025-03-03 NOTE — PROGRESS NOTES
"Treatment note  Physical Therapy Treatment  Patient Name:Irvin Galvan  MRN:[id number]  Today's Date:3/3/2025  Referred by: Jhonatan Aguilar  Time Calculation  Start Time: 0700  Stop Time: 0740  Time Calculation (min): 40 min    Therapy Diagnosis  1. Sciatica of right side    2. Knee pain, unspecified chronicity, unspecified laterality        Insurance  Visit number: 2  Approved number of visits: 40  Onset Date: 2025  Certification Period:  Beginnin2025            Ending:   Payor: [employer] [facility] / Plan: [employer] / Product Type: *No Product type* /     Precautions/Fall Risk:none* Pacemaker no  Seizures No  Post Op Movement/Restrictions No    Subjective/Pain: Patient reports he has backed off the exercises and lots of activity has seemed to calm things down. Has been very careful with bending. Sitting causing just back pain. He saw ortho who wants him to continue therapy and get an MRI.   Current Pain Level (0-10): 2    Objective/Outcome Measures:  Moderate tenderness with palpation to R glute med  Quad/Hip flexor tightness    Treatment  Therapeutic Exercise 30 minutes  TM walking 5 minutes with core engagement  TrA Bracing: 3\" x 20   Iso ADD/ABD + PPT: x20 3\"  Table Top Iso TrA bracing: x 20   Standing Cable column Anti-Trunk Rotation: 10x 10# R/L  MHP to Low back w/ prone lying 3'     Manual Therapy   minutes  Sacral Distraction/QL and Glute med release     Neuromuscular Re-ed   minutes    Gait Training   minutes    Modalities   Vasopneumatic Device       minutes  Electrical Stimulation        10 minutes to lumbar spine while KAMI + MHP 10 minutes Lvl 3.5 continuous TENS for pain and muscle relaxation  Ultrasound            minutes  Iontophoresis                     minutes  Cold Pack            minutes  Mechanical Traction           minutes    Assessment: Attempted supine, neutral spine core stabilization today with good response and no increase in symptomatic complaints whereas " previous hip mobility and strengthening were exacerbating. He did have some increase in complaints with standing anti-trunk rotation but was able to tolerate. Use of MHP and TENS modalities post session to alleviate low back pain with favorable response.     Plan:  Continue neutral spine stabilization as tolerated, modalities PRN    Goals:  Lumbar Goals  Lumbar Spine Goals:  By discharge, patient will:  1) Demonstrate independence with home exercise program  2) Tolerate PLOA without adverse reaction  3) Increase ROM of the lumbar spine to ? in order to improve the ability to perform essential ADLs  4) Increase strength of ? to ? in order to improve the ability to perform essential ADLs  5) Improve positional tolerances of standing, sitting, walking > 60 minutes for essential ADLs and work duties.   6) Report decrease in pain by >= 2 points to meet MCID  7) Decrease score of Modified EMERSON by > 6 points to meet the MCID  8) Ascend and descend 1 flight of stairs  stairs without an increase in symptoms  10) Be able to sleep through the night without an increase in symptoms  11) Be able to perform the ADL of sitting and bending without an increase or production of symptoms  12) Decrease time on 5x sit to stand test by > 2.3 sec to meet the MCID  Patient stated goal: relief of pain and tightness in LE

## 2025-03-10 ENCOUNTER — TREATMENT (OUTPATIENT)
Dept: PHYSICAL THERAPY | Facility: CLINIC | Age: 65
End: 2025-03-10
Payer: COMMERCIAL

## 2025-03-10 DIAGNOSIS — M54.31 SCIATICA OF RIGHT SIDE: Primary | ICD-10-CM

## 2025-03-10 PROCEDURE — 97110 THERAPEUTIC EXERCISES: CPT | Mod: GP | Performed by: PHYSICAL THERAPIST

## 2025-03-10 NOTE — PROGRESS NOTES
"Treatment note  Physical Therapy Treatment  Patient Name:Irvin Galvan  MRN:[id number]  Today's Date:3/10/2025  Referred by: Jhonatan Aguilar  Time Calculation  Start Time: 747  Stop Time: 827  Time Calculation (min): 40 min    Therapy Diagnosis  1. Sciatica of right side      Insurance  Visit number: 4  Approved number of visits: 40  Onset Date: 2025  Certification Period:  Beginnin2025            Ending:   Payor: MEDICAL [facility] / Plan: [employer] / Product Type: *No Product type* /     Precautions/Fall Risk:none* Pacemaker no  Seizures No  Post Op Movement/Restrictions No    Subjective/Pain: Patient reports he is not too bad this morning but overall about the same. He states he has been very careful and avoiding any type of bending in his low back which seems to flare his symptoms.   Current Pain Level (0-10): 2    Objective/Outcome Measures:  Moderate tenderness with palpation to R glute med  Quad/Hip flexor tightness    Treatment  Therapeutic Exercise 40 minutes  LE bike 6 minutes with core engagement  Prone Lyin min  Prone on Elbows: 2 min  REIL: x 20   Iso ADD + PPT: x 20 3\"  BKFO: Red R/L x 20 ea   LTR with legs on PB: 10 x R/L   Clamshells: 2 x 10   Squat sit: unable to tolerate    Manual Therapy   minutes  Sacral Distraction/QL and Glute med release     Neuromuscular Re-ed   minutes    Gait Training   minutes    Modalities   Vasopneumatic Device       minutes  Electrical Stimulation          minutes to lumbar spine while KAMI + MHP 10 minutes Lvl 3.5 continuous TENS for pain and muscle relaxation  Ultrasound            minutes  Iontophoresis                     minutes  Cold Pack            minutes  Mechanical Traction           minutes    Assessment: Patient tolerated neutral spine core stabilization exercises last session without an increase in overall symptomatic complaints. Will continue neutral spine core stabilization today. Attempted extension biased movements " this date which he tolerates without complaints, but does present with lumbar spine ROM limitations as his hips lift off table during REIL. Unable to tolerate table top isometric TrA but can perform LTR on PB with minimal complaints. Standing trunk rotation exacerbates symptoms otherwise his baseline complaints are constant and unchanged. Use of MHP and TENS modalities post session to alleviate low back pain with favorable response.     Plan:  MRI scheduled for March 21st - follow up PRN following MRI results    Goals:  Lumbar Goals  Lumbar Spine Goals:  By discharge, patient will:  1) Demonstrate independence with home exercise program  2) Tolerate PLOA without adverse reaction  3) Increase ROM of the lumbar spine to ? in order to improve the ability to perform essential ADLs  4) Increase strength of ? to ? in order to improve the ability to perform essential ADLs  5) Improve positional tolerances of standing, sitting, walking > 60 minutes for essential ADLs and work duties.   6) Report decrease in pain by >= 2 points to meet MCID  7) Decrease score of Modified EMERSON by > 6 points to meet the MCID  8) Ascend and descend 1 flight of stairs  stairs without an increase in symptoms  10) Be able to sleep through the night without an increase in symptoms  11) Be able to perform the ADL of sitting and bending without an increase or production of symptoms  12) Decrease time on 5x sit to stand test by > 2.3 sec to meet the MCID  Patient stated goal: relief of pain and tightness in LE

## 2025-03-21 ENCOUNTER — HOSPITAL ENCOUNTER (OUTPATIENT)
Dept: RADIOLOGY | Facility: CLINIC | Age: 65
Discharge: HOME | End: 2025-03-21
Payer: COMMERCIAL

## 2025-03-21 DIAGNOSIS — M54.50 LUMBAR PAIN: ICD-10-CM

## 2025-03-21 DIAGNOSIS — M54.31 SCIATICA OF RIGHT SIDE: ICD-10-CM

## 2025-03-21 PROCEDURE — 72148 MRI LUMBAR SPINE W/O DYE: CPT

## 2025-03-24 ENCOUNTER — APPOINTMENT (OUTPATIENT)
Dept: RADIOLOGY | Facility: CLINIC | Age: 65
End: 2025-03-24
Payer: COMMERCIAL

## 2025-04-02 ENCOUNTER — OFFICE VISIT (OUTPATIENT)
Dept: ORTHOPEDIC SURGERY | Facility: CLINIC | Age: 65
End: 2025-04-02
Payer: COMMERCIAL

## 2025-04-02 DIAGNOSIS — M54.16 LUMBAR RADICULOPATHY: Primary | ICD-10-CM

## 2025-04-02 PROCEDURE — 99213 OFFICE O/P EST LOW 20 MIN: CPT | Performed by: PHYSICIAN ASSISTANT

## 2025-04-02 PROCEDURE — 1036F TOBACCO NON-USER: CPT | Performed by: PHYSICIAN ASSISTANT

## 2025-04-02 NOTE — PROGRESS NOTES
Irvin Galvan is a 65 y.o. male who presents for Follow-up of the Lower Back (F/U MRI done at ).    HPI:  65-year-old gentleman here for follow-up lumbar MRI results.  He denies any fever chills nausea vomiting night sweats.  He has no bowel or bladder complaints.    Physical exam:  Well-nourished, well kept.No lymphangitis or lymphadenopathy in the examined extremities.  Gait normal.  Can stand on heels and toes.   Examination of the back shows tenderness in the paraspinous musculature.  There is decreased range of motion in all directions due to guarding/muscle spasms and pain at extremes.  There is good strength and no instability.  Examination of the lower extremities reveals no point tenderness, swelling, or deformity.  Range of motion of the hips, knees, and ankles are full without crepitance, instability, or exacerbation of pain.  Strength is 5/5 throughout.  No redness, abrasions, or lesions on extremities  Gross sensation intact in the extremities.   Affect normal.  Alert and oriented ×3.  Coordination normal.    Imaging studies:  An MRI of the lumbar spine from March 21, 2025 was reviewed.    Assessment:  65-year-old gentleman here for lumbar MRI results.  He is still having low back pain and right leg radicular pain.  See previous notes for details.  He did 3 visits of physical therapy and he did not feel that it was helping at all.  Left-sided issues.  He is still having back pain radiating to the right SI area into the right buttock, it we will skip his thigh hamstring and start again in his right calf and radiate down into the ankle and right foot.  Symptoms consistent with a skip lesion.  His MRI shows stenosis on the right at L3-4 and to a worse degree right at L4-5.  He has some facet hypertrophy and disc material at L4-5 that is causing moderate to severe stenosis at that level on the right.  He is bad enough to consider surgery at this point.    Plan:  I would like to get an EMG of his lower  extremities bilaterally and work this up further, he has an atypical presentation of radiculopathy with a skip lesion.    Jarvis Garcia PA-C

## 2025-04-14 ENCOUNTER — HOSPITAL ENCOUNTER (OUTPATIENT)
Dept: NEUROLOGY | Facility: CLINIC | Age: 65
Discharge: HOME | End: 2025-04-14
Payer: COMMERCIAL

## 2025-04-14 DIAGNOSIS — M54.16 LUMBAR RADICULOPATHY: ICD-10-CM

## 2025-04-14 PROCEDURE — 95909 NRV CNDJ TST 5-6 STUDIES: CPT | Performed by: STUDENT IN AN ORGANIZED HEALTH CARE EDUCATION/TRAINING PROGRAM

## 2025-04-14 PROCEDURE — 95886 MUSC TEST DONE W/N TEST COMP: CPT | Mod: GC | Performed by: STUDENT IN AN ORGANIZED HEALTH CARE EDUCATION/TRAINING PROGRAM

## 2025-04-14 PROCEDURE — 95909 NRV CNDJ TST 5-6 STUDIES: CPT | Mod: GC | Performed by: STUDENT IN AN ORGANIZED HEALTH CARE EDUCATION/TRAINING PROGRAM

## 2025-04-14 PROCEDURE — 95886 MUSC TEST DONE W/N TEST COMP: CPT | Performed by: STUDENT IN AN ORGANIZED HEALTH CARE EDUCATION/TRAINING PROGRAM

## 2025-05-10 DIAGNOSIS — I10 BENIGN ESSENTIAL HTN: ICD-10-CM

## 2025-05-12 RX ORDER — LISINOPRIL AND HYDROCHLOROTHIAZIDE 12.5; 2 MG/1; MG/1
1 TABLET ORAL DAILY
Qty: 30 TABLET | Refills: 5 | Status: SHIPPED | OUTPATIENT
Start: 2025-05-12

## 2025-05-19 ENCOUNTER — APPOINTMENT (OUTPATIENT)
Dept: ORTHOPEDIC SURGERY | Facility: CLINIC | Age: 65
End: 2025-05-19
Payer: COMMERCIAL

## 2025-05-19 ENCOUNTER — OFFICE VISIT (OUTPATIENT)
Dept: ORTHOPEDIC SURGERY | Facility: CLINIC | Age: 65
End: 2025-05-19
Payer: COMMERCIAL

## 2025-05-19 DIAGNOSIS — M54.16 LUMBAR RADICULOPATHY: Primary | ICD-10-CM

## 2025-05-19 PROCEDURE — 99215 OFFICE O/P EST HI 40 MIN: CPT | Performed by: PHYSICIAN ASSISTANT

## 2025-05-19 PROCEDURE — 99212 OFFICE O/P EST SF 10 MIN: CPT | Performed by: PHYSICIAN ASSISTANT

## 2025-05-19 NOTE — PROGRESS NOTES
Irvin Galvan is a 65 y.o. male who presents for Follow-up of the Lower Back.    HPI:  65-year-old gentleman here for follow-up low back EMG.  He denies any fever chills nausea vomiting night sweats.  He has no bowel or bladder complaints.    Physical exam:  Well-nourished, well kept.No lymphangitis or lymphadenopathy in the examined extremities.  Gait normal.  Can stand on heels and toes.   Examination of the back shows tenderness in the paraspinous musculature in the right lower lumbosacral area.  There is decreased range of motion in all directions due to guarding/muscle spasms and pain at extremes.  There is good strength and no instability.  Examination of the lower extremities reveals no point tenderness, swelling, or deformity.  Range of motion of the hips, knees, and ankles are full without crepitance, instability, or exacerbation of pain.  Strength is 5/5 throughout.  No redness, abrasions, or lesions on extremities  Gross sensation intact in the extremities.  Affect normal.  Alert and oriented ×3.  Coordination normal.    Diagnostic studies:  An EMG of the lower extremities from April 14, 2025 was reviewed today.    Assessment:  65-year-old gentleman here for follow-up low back EMG results.  He has had previous x-rays and MRI of the lumbar spine.  He is still having right lumbosacral lower back and buttock pain radiating into the buttock and stopping.  The pain then resumes at the back of the knee and the calf and will radiate down to the right foot.  He does not have any quadricep thigh or hamstring pain, this is presenting like a skip lesion.  This has been going on for about 4-1/2 months without any precipitating injury or specific event.  He has some moderate to severe degenerative changes on his x-ray most notably at L5-S1 and to a lesser degree at L4-5.  I do not see any spondylolisthesis.  He has done multiple visits of physical therapy which did not give him any relief.  The MRI shows some moderate  to severe stenosis on the right at L4-5, he has some mild stenosis on the right at L3-4 as well.  Patient is severely inhibited with bodily function and he does not think he can manage this pain.  He would like surgical intervention.  I had a long discussion today with the patient about surgical options and other options including pain management and injections, the patient does not want to consider any pain management or injections.  He would like a definitive fix for this issue.  A surgery on him would most likely be an L4-5 right discectomy, depending on how much material goes out laterally into the lateral recess we may need to consider taking down the facet at L4-5 on the right which would cause iatrogenic instability requiring a fusion at that level.    Plan:  I will have him meet with Dr. Kerr for surgical planning.  He is a healthy 65-year-old gentleman, he does not take blood thinners.  He does not have any allergies.  He has no significant health problems that would prohibit surgical intervention.    I have reviewed tests today, x-rays.  This is a patient with an exacerbation of a chronic problem with severe inhibition of bodily function.  We did consider and discussed surgery today.    Jarvis Garcia PA-C

## 2025-06-17 ENCOUNTER — OFFICE VISIT (OUTPATIENT)
Dept: ORTHOPEDIC SURGERY | Facility: CLINIC | Age: 65
End: 2025-06-17
Payer: COMMERCIAL

## 2025-06-17 VITALS — WEIGHT: 185 LBS | BODY MASS INDEX: 23 KG/M2 | HEIGHT: 75 IN

## 2025-06-17 DIAGNOSIS — M54.9 BACK PAIN WITHOUT RADICULOPATHY: Primary | ICD-10-CM

## 2025-06-17 PROCEDURE — 99212 OFFICE O/P EST SF 10 MIN: CPT | Performed by: ORTHOPAEDIC SURGERY

## 2025-06-17 PROCEDURE — 1036F TOBACCO NON-USER: CPT | Performed by: ORTHOPAEDIC SURGERY

## 2025-06-17 PROCEDURE — 99214 OFFICE O/P EST MOD 30 MIN: CPT | Performed by: ORTHOPAEDIC SURGERY

## 2025-06-17 PROCEDURE — 3008F BODY MASS INDEX DOCD: CPT | Performed by: ORTHOPAEDIC SURGERY

## 2025-06-17 NOTE — PROGRESS NOTES
Chief complaint: Back pain and right calf pain    HPI: Patient with a 4-month history of back pain and right lateral calf pain.  He was sent to me by Malcolm who thought there may be a radiculopathy going on here.  He has no left-sided symptoms.  He did physical therapy without much relief.  He said no other treatments.  No traumatic events.  No fevers chills nausea vomiting.  No bowel or bladder changes.  He golfed recently.  He thinks he could live with the symptoms.  This is a new acute problem of uncertain prognosis.    Physical exam: Well-nourished, well kept.  Good perfusion to the extremities ×4.  Radial and dorsalis pedis pulses 2+.  Capillary refill to all 4 digits brisk.  No distal edema x 4.  No lymphangitis or lymphadenopathy in the examined extremities.  Gait normal.  Can walk on heels and toes.  Thoracic spine is nontender.  Examination of the back shows tenderness in the paraspinous musculature.  There is decreased range of motion in all directions due to guarding/muscle spasms and pain at extremes.  There is good strength and no instability.  Examination of the lower extremities reveals no point tenderness, swelling, or deformity.  Range of motion of the hips, knees, and ankles are full without crepitance, instability, or exacerbation of pain.  Strength is 5/5 throughout except there is a little tenderness in the lateral aspect of his calf on the right side.  He is also tender in the medial part of his right knee..  No redness, abrasions, or lesions on all 4 extremities, head and neck, or trunk.  Gross sensation intact in the extremities ×4.  Deep tendon reflexes 2+ and symmetric bilaterally.  Daniel and clonus were negative.  Straight leg raise negative.  Affect normal.  Alert and oriented ×3.  Coordination normal.    X-rays and MRI reviewed.  Do not see any real significant stenosis except some lateral recess stenosis on the right at L4-5.  There is a grade 1 spondylolisthesis at L4-5 as well.   There is significant degenerative disc at L5-S1.    Assessment/plan: Patient with back pain and right lateral calf pain.  I think the back pain is coming from the degenerative changes in the lumbar spine most notably L4-5 and L5-S1.  The right lateral Symptoms are likely more related to his calf and not his back.  It could be the lateral recess stenosis at L4-5 is giving him his symptoms but not likely as this would be a skip lesion.  At this point he thinks he can live with his symptoms.  We did discuss and consider surgery on him which would be a TLIF at L4-5.  He does not have any interest in that and I am not sure that scan given the relief he is looking for.  I recommend he engage in activities as tolerated.  He can Geraldine follow-up with Dr. Aguilar for his right calf and knee issues that he is having is Dr. Aguilar has already scoped his right knee.  The patient can see me on a as needed basis.

## 2025-11-10 ENCOUNTER — APPOINTMENT (OUTPATIENT)
Dept: PRIMARY CARE | Facility: CLINIC | Age: 65
End: 2025-11-10
Payer: COMMERCIAL